# Patient Record
Sex: MALE | Employment: FULL TIME | URBAN - METROPOLITAN AREA
[De-identification: names, ages, dates, MRNs, and addresses within clinical notes are randomized per-mention and may not be internally consistent; named-entity substitution may affect disease eponyms.]

---

## 2020-07-21 ENCOUNTER — ANESTHESIA EVENT (OUTPATIENT)
Dept: OPERATING ROOM | Age: 40
End: 2020-07-21
Payer: COMMERCIAL

## 2020-07-22 ENCOUNTER — HOSPITAL ENCOUNTER (OUTPATIENT)
Age: 40
Setting detail: OUTPATIENT SURGERY
Discharge: HOME | End: 2020-07-22
Attending: PODIATRIST | Admitting: PODIATRIST
Payer: COMMERCIAL

## 2020-07-22 ENCOUNTER — ANESTHESIA (OUTPATIENT)
Dept: OPERATING ROOM | Age: 40
End: 2020-07-22
Payer: COMMERCIAL

## 2020-07-22 VITALS
HEART RATE: 52 BPM | WEIGHT: 215 LBS | RESPIRATION RATE: 16 BRPM | DIASTOLIC BLOOD PRESSURE: 84 MMHG | TEMPERATURE: 97 F | SYSTOLIC BLOOD PRESSURE: 122 MMHG | HEIGHT: 74 IN | OXYGEN SATURATION: 100 % | BODY MASS INDEX: 27.59 KG/M2

## 2020-07-22 VITALS
SYSTOLIC BLOOD PRESSURE: 87 MMHG | TEMPERATURE: 98.2 F | OXYGEN SATURATION: 100 % | RESPIRATION RATE: 5 BRPM | DIASTOLIC BLOOD PRESSURE: 54 MMHG

## 2020-07-22 PROCEDURE — 3600000013 HC SURGERY LEVEL 3 ADDTL 15MIN: Performed by: PODIATRIST

## 2020-07-22 PROCEDURE — 7100000011 HC PHASE II RECOVERY - ADDTL 15 MIN: Performed by: PODIATRIST

## 2020-07-22 PROCEDURE — 2500000003 HC RX 250 WO HCPCS: Performed by: NURSE ANESTHETIST, CERTIFIED REGISTERED

## 2020-07-22 PROCEDURE — 3700000000 HC ANESTHESIA ATTENDED CARE: Performed by: PODIATRIST

## 2020-07-22 PROCEDURE — 7100000010 HC PHASE II RECOVERY - FIRST 15 MIN: Performed by: PODIATRIST

## 2020-07-22 PROCEDURE — C1776 JOINT DEVICE (IMPLANTABLE): HCPCS | Performed by: PODIATRIST

## 2020-07-22 PROCEDURE — 2580000003 HC RX 258: Performed by: ANESTHESIOLOGY

## 2020-07-22 PROCEDURE — 3700000001 HC ADD 15 MINUTES (ANESTHESIA): Performed by: PODIATRIST

## 2020-07-22 PROCEDURE — 6360000002 HC RX W HCPCS: Performed by: ANESTHESIOLOGY

## 2020-07-22 PROCEDURE — 6360000002 HC RX W HCPCS: Performed by: NURSE ANESTHETIST, CERTIFIED REGISTERED

## 2020-07-22 PROCEDURE — 2500000003 HC RX 250 WO HCPCS: Performed by: PODIATRIST

## 2020-07-22 PROCEDURE — 2720000010 HC SURG SUPPLY STERILE: Performed by: PODIATRIST

## 2020-07-22 PROCEDURE — 2500000003 HC RX 250 WO HCPCS: Performed by: ANESTHESIOLOGY

## 2020-07-22 PROCEDURE — 7100000000 HC PACU RECOVERY - FIRST 15 MIN: Performed by: PODIATRIST

## 2020-07-22 PROCEDURE — 7100000001 HC PACU RECOVERY - ADDTL 15 MIN: Performed by: PODIATRIST

## 2020-07-22 PROCEDURE — 3600000003 HC SURGERY LEVEL 3 BASE: Performed by: PODIATRIST

## 2020-07-22 PROCEDURE — 2709999900 HC NON-CHARGEABLE SUPPLY: Performed by: PODIATRIST

## 2020-07-22 PROCEDURE — 6360000002 HC RX W HCPCS: Performed by: PODIATRIST

## 2020-07-22 DEVICE — IMPLANTABLE DEVICE: Type: IMPLANTABLE DEVICE | Status: FUNCTIONAL

## 2020-07-22 DEVICE — PATCH AMNION 2 LAYR PROTCT 4 X 4CM STERISHIELD II: Type: IMPLANTABLE DEVICE | Status: FUNCTIONAL

## 2020-07-22 RX ORDER — FENTANYL CITRATE 50 UG/ML
INJECTION, SOLUTION INTRAMUSCULAR; INTRAVENOUS PRN
Status: DISCONTINUED | OUTPATIENT
Start: 2020-07-22 | End: 2020-07-22 | Stop reason: SDUPTHER

## 2020-07-22 RX ORDER — LIDOCAINE HYDROCHLORIDE 20 MG/ML
INJECTION, SOLUTION EPIDURAL; INFILTRATION; INTRACAUDAL; PERINEURAL PRN
Status: DISCONTINUED | OUTPATIENT
Start: 2020-07-22 | End: 2020-07-22 | Stop reason: SDUPTHER

## 2020-07-22 RX ORDER — PROMETHAZINE HYDROCHLORIDE 25 MG/ML
6.25 INJECTION, SOLUTION INTRAMUSCULAR; INTRAVENOUS
Status: DISCONTINUED | OUTPATIENT
Start: 2020-07-22 | End: 2020-07-22 | Stop reason: HOSPADM

## 2020-07-22 RX ORDER — MORPHINE SULFATE 2 MG/ML
1 INJECTION, SOLUTION INTRAMUSCULAR; INTRAVENOUS EVERY 5 MIN PRN
Status: DISCONTINUED | OUTPATIENT
Start: 2020-07-22 | End: 2020-07-22 | Stop reason: HOSPADM

## 2020-07-22 RX ORDER — DIPHENHYDRAMINE HYDROCHLORIDE 50 MG/ML
12.5 INJECTION INTRAMUSCULAR; INTRAVENOUS
Status: DISCONTINUED | OUTPATIENT
Start: 2020-07-22 | End: 2020-07-22 | Stop reason: HOSPADM

## 2020-07-22 RX ORDER — ONDANSETRON 2 MG/ML
INJECTION INTRAMUSCULAR; INTRAVENOUS PRN
Status: DISCONTINUED | OUTPATIENT
Start: 2020-07-22 | End: 2020-07-22 | Stop reason: SDUPTHER

## 2020-07-22 RX ORDER — OXYCODONE HYDROCHLORIDE AND ACETAMINOPHEN 5; 325 MG/1; MG/1
1 TABLET ORAL PRN
Status: DISCONTINUED | OUTPATIENT
Start: 2020-07-22 | End: 2020-07-22 | Stop reason: HOSPADM

## 2020-07-22 RX ORDER — ONDANSETRON 2 MG/ML
4 INJECTION INTRAMUSCULAR; INTRAVENOUS PRN
Status: DISCONTINUED | OUTPATIENT
Start: 2020-07-22 | End: 2020-07-22 | Stop reason: HOSPADM

## 2020-07-22 RX ORDER — OXYCODONE HYDROCHLORIDE AND ACETAMINOPHEN 5; 325 MG/1; MG/1
2 TABLET ORAL PRN
Status: DISCONTINUED | OUTPATIENT
Start: 2020-07-22 | End: 2020-07-22 | Stop reason: HOSPADM

## 2020-07-22 RX ORDER — DEXAMETHASONE SODIUM PHOSPHATE 4 MG/ML
INJECTION, SOLUTION INTRA-ARTICULAR; INTRALESIONAL; INTRAMUSCULAR; INTRAVENOUS; SOFT TISSUE PRN
Status: DISCONTINUED | OUTPATIENT
Start: 2020-07-22 | End: 2020-07-22 | Stop reason: SDUPTHER

## 2020-07-22 RX ORDER — PROPOFOL 10 MG/ML
INJECTION, EMULSION INTRAVENOUS PRN
Status: DISCONTINUED | OUTPATIENT
Start: 2020-07-22 | End: 2020-07-22 | Stop reason: SDUPTHER

## 2020-07-22 RX ORDER — GLYCOPYRROLATE 0.2 MG/ML
INJECTION INTRAMUSCULAR; INTRAVENOUS PRN
Status: DISCONTINUED | OUTPATIENT
Start: 2020-07-22 | End: 2020-07-22 | Stop reason: SDUPTHER

## 2020-07-22 RX ORDER — LABETALOL HYDROCHLORIDE 5 MG/ML
5 INJECTION, SOLUTION INTRAVENOUS EVERY 10 MIN PRN
Status: DISCONTINUED | OUTPATIENT
Start: 2020-07-22 | End: 2020-07-22 | Stop reason: HOSPADM

## 2020-07-22 RX ORDER — HYDRALAZINE HYDROCHLORIDE 20 MG/ML
5 INJECTION INTRAMUSCULAR; INTRAVENOUS EVERY 10 MIN PRN
Status: DISCONTINUED | OUTPATIENT
Start: 2020-07-22 | End: 2020-07-22 | Stop reason: HOSPADM

## 2020-07-22 RX ORDER — SODIUM CHLORIDE, SODIUM LACTATE, POTASSIUM CHLORIDE, CALCIUM CHLORIDE 600; 310; 30; 20 MG/100ML; MG/100ML; MG/100ML; MG/100ML
INJECTION, SOLUTION INTRAVENOUS CONTINUOUS
Status: DISCONTINUED | OUTPATIENT
Start: 2020-07-22 | End: 2020-07-22 | Stop reason: HOSPADM

## 2020-07-22 RX ORDER — BUPIVACAINE HYDROCHLORIDE 5 MG/ML
INJECTION, SOLUTION EPIDURAL; INTRACAUDAL PRN
Status: DISCONTINUED | OUTPATIENT
Start: 2020-07-22 | End: 2020-07-22 | Stop reason: ALTCHOICE

## 2020-07-22 RX ORDER — LIDOCAINE HYDROCHLORIDE 10 MG/ML
INJECTION, SOLUTION EPIDURAL; INFILTRATION; INTRACAUDAL; PERINEURAL
Status: DISCONTINUED
Start: 2020-07-22 | End: 2020-07-22 | Stop reason: HOSPADM

## 2020-07-22 RX ORDER — MORPHINE SULFATE 2 MG/ML
2 INJECTION, SOLUTION INTRAMUSCULAR; INTRAVENOUS EVERY 5 MIN PRN
Status: DISCONTINUED | OUTPATIENT
Start: 2020-07-22 | End: 2020-07-22 | Stop reason: HOSPADM

## 2020-07-22 RX ORDER — LIDOCAINE HYDROCHLORIDE 10 MG/ML
2 INJECTION, SOLUTION INFILTRATION; PERINEURAL
Status: COMPLETED | OUTPATIENT
Start: 2020-07-22 | End: 2020-07-22

## 2020-07-22 RX ADMIN — LIDOCAINE HYDROCHLORIDE 100 MG: 20 INJECTION, SOLUTION EPIDURAL; INFILTRATION; INTRACAUDAL; PERINEURAL at 09:25

## 2020-07-22 RX ADMIN — HYDROMORPHONE HYDROCHLORIDE 0.5 MG: 1 INJECTION, SOLUTION INTRAMUSCULAR; INTRAVENOUS; SUBCUTANEOUS at 10:31

## 2020-07-22 RX ADMIN — Medication 2 G: at 09:26

## 2020-07-22 RX ADMIN — FENTANYL CITRATE 100 MCG: 50 INJECTION INTRAMUSCULAR; INTRAVENOUS at 09:25

## 2020-07-22 RX ADMIN — ONDANSETRON 4 MG: 2 INJECTION, SOLUTION INTRAMUSCULAR; INTRAVENOUS at 09:37

## 2020-07-22 RX ADMIN — PROPOFOL 300 MG: 10 INJECTION, EMULSION INTRAVENOUS at 09:25

## 2020-07-22 RX ADMIN — SODIUM CHLORIDE, POTASSIUM CHLORIDE, SODIUM LACTATE AND CALCIUM CHLORIDE: 600; 310; 30; 20 INJECTION, SOLUTION INTRAVENOUS at 07:40

## 2020-07-22 RX ADMIN — LIDOCAINE HYDROCHLORIDE 0.2 ML: 10 INJECTION, SOLUTION EPIDURAL; INFILTRATION; INTRACAUDAL; PERINEURAL at 07:38

## 2020-07-22 RX ADMIN — DEXAMETHASONE SODIUM PHOSPHATE 8 MG: 4 INJECTION, SOLUTION INTRAMUSCULAR; INTRAVENOUS at 09:37

## 2020-07-22 RX ADMIN — GLYCOPYRROLATE 0.2 MG: 0.2 INJECTION, SOLUTION INTRAMUSCULAR; INTRAVENOUS at 09:47

## 2020-07-22 ASSESSMENT — PAIN DESCRIPTION - PAIN TYPE
TYPE: SURGICAL PAIN
TYPE: SURGICAL PAIN

## 2020-07-22 ASSESSMENT — PULMONARY FUNCTION TESTS
PIF_VALUE: 10
PIF_VALUE: 10
PIF_VALUE: 2
PIF_VALUE: 5
PIF_VALUE: 10
PIF_VALUE: 11
PIF_VALUE: 10
PIF_VALUE: 6
PIF_VALUE: 10
PIF_VALUE: 2
PIF_VALUE: 10
PIF_VALUE: 1
PIF_VALUE: 10
PIF_VALUE: 1
PIF_VALUE: 2
PIF_VALUE: 3
PIF_VALUE: 6
PIF_VALUE: 5
PIF_VALUE: 1
PIF_VALUE: 7
PIF_VALUE: 10
PIF_VALUE: 2
PIF_VALUE: 10
PIF_VALUE: 2
PIF_VALUE: 11
PIF_VALUE: 10
PIF_VALUE: 1
PIF_VALUE: 10
PIF_VALUE: 5
PIF_VALUE: 10
PIF_VALUE: 10
PIF_VALUE: 2
PIF_VALUE: 10
PIF_VALUE: 11
PIF_VALUE: 5
PIF_VALUE: 3
PIF_VALUE: 11
PIF_VALUE: 10

## 2020-07-22 ASSESSMENT — PAIN - FUNCTIONAL ASSESSMENT: PAIN_FUNCTIONAL_ASSESSMENT: 0-10

## 2020-07-22 ASSESSMENT — PAIN SCALES - GENERAL
PAINLEVEL_OUTOF10: 0
PAINLEVEL_OUTOF10: 3
PAINLEVEL_OUTOF10: 7
PAINLEVEL_OUTOF10: 0

## 2020-07-22 ASSESSMENT — PAIN DESCRIPTION - DESCRIPTORS
DESCRIPTORS: ACHING
DESCRIPTORS: ACHING

## 2020-07-22 ASSESSMENT — PAIN DESCRIPTION - LOCATION
LOCATION: FOOT
LOCATION: FOOT

## 2020-07-22 ASSESSMENT — PAIN DESCRIPTION - ORIENTATION
ORIENTATION: RIGHT
ORIENTATION: RIGHT

## 2020-07-22 ASSESSMENT — PAIN DESCRIPTION - FREQUENCY
FREQUENCY: CONTINUOUS
FREQUENCY: CONTINUOUS

## 2020-07-22 NOTE — ANESTHESIA PRE PROCEDURE
Department of Anesthesiology  Preprocedure Note       Name:  Hudson Rubinstein   Age:  37 y.o.  :  1980                                          MRN:  [id number]         Date:  2020      Surgeon: Mirtha Virk):  Lori Mojica DPM    Procedure: Procedure(s):  RIGHT FOOT CHEILECTOMY, FUSION SECOND DIGIT DISTAL INTERPHALANGEAL JOINT    Medications prior to admission:   Prior to Admission medications    Not on File       Current medications:    No current facility-administered medications for this encounter. Allergies: Allergies   Allergen Reactions    Pcn [Penicillins]      As a child       Problem List:  There is no problem list on file for this patient. Past Medical History:  No past medical history on file. Past Surgical History:        Procedure Laterality Date    BACK SURGERY      HERNIA REPAIR         Social History:    Social History     Tobacco Use    Smoking status: Current Every Day Smoker     Packs/day: 1.00    Smokeless tobacco: Never Used   Substance Use Topics    Alcohol use: Yes     Comment: social                                Ready to quit: Not Answered  Counseling given: Not Answered      Vital Signs (Current):   Vitals:    07/15/20 1533   Weight: 215 lb (97.5 kg)   Height: 6' 2\" (1.88 m)                                              BP Readings from Last 3 Encounters:   No data found for BP       NPO Status:                                                                                 BMI:   Wt Readings from Last 3 Encounters:   07/15/20 215 lb (97.5 kg)     Body mass index is 27.6 kg/m². CBC: No results found for: WBC, RBC, HGB, HCT, MCV, RDW, PLT    CMP: No results found for: NA, K, CL, CO2, BUN, CREATININE, GFRAA, AGRATIO, LABGLOM, GLUCOSE, PROT, CALCIUM, BILITOT, ALKPHOS, AST, ALT    POC Tests: No results for input(s): POCGLU, POCNA, POCK, POCCL, POCBUN, POCHEMO, POCHCT in the last 72 hours.     Coags: No results found for: PROTIME, INR, APTT    HCG (If Applicable): No results found for: PREGTESTUR, PREGSERUM, HCG, HCGQUANT     ABGs: No results found for: PHART, PO2ART, ZJQ7DLF, VUF8DTI, BEART, O2CIDIHN     Type & Screen (If Applicable):  No results found for: LABABO, LABRH    Drug/Infectious Status (If Applicable):  No results found for: HIV, HEPCAB    COVID-19 Screening (If Applicable): No results found for: COVID19      Anesthesia Evaluation  Patient summary reviewed and Nursing notes reviewed no history of anesthetic complications:   Airway: Mallampati: II     Neck ROM: full   Dental:          Pulmonary:Negative Pulmonary ROS and normal exam                               Cardiovascular:Negative CV ROS                      Neuro/Psych:   Negative Neuro/Psych ROS              GI/Hepatic/Renal: Neg GI/Hepatic/Renal ROS       (-) hiatal hernia and GERD       Endo/Other: Negative Endo/Other ROS                    Abdominal:           Vascular:                                        Anesthesia Plan      general     ASA 2     (I discussed with the patient the risks and benefits of PIV, general anesthesia, IV Narcotics, PACU. All questions were answered the patient agrees with the plan and wishes to proceed.  )  Induction: intravenous.                           Carmen Adams MD   7/22/2020

## 2020-07-22 NOTE — H&P
H&P reviewed, the patient was examined, and no changes have occurred in the patient's condition since the H&P was completed. Site confirmed and postoperative instructions reviewed with patient.

## 2020-07-22 NOTE — ANESTHESIA POSTPROCEDURE EVALUATION
Department of Anesthesiology  Postprocedure Note    Patient: Aline Christensen  MRN: [id number]  YOB: 1980  Date of evaluation: 7/22/2020  Time:  11:30 AM     Procedure Summary     Date:  07/22/20 Room / Location:  [address] [place] [address] OR 01 / [facility]    Anesthesia Start:  7825 Anesthesia Stop:  2050    Procedure:  RIGHT FOOT CHEILECTOMY, FUSION SECOND DIGIT DISTAL INTERPHALANGEAL JOINT (Right Foot) Diagnosis:  (RIGHT FOOT HALLUX LIMITUS, SECOND DIGIT HAMMER TOE)    Surgeon:  Jesus Williamson DPM Responsible Provider:  Laura Elizalde MD    Anesthesia Type:  general ASA Status:  2          Anesthesia Type: general    Nate Phase I: Nate Score: 10    Nate Phase II: Nate Score: 10    Last vitals: Reviewed and per EMR flowsheets.        Anesthesia Post Evaluation    Comments: Postoperative Anesthesia Note    Name:    Aline Christensen  MRN:      [id number]    Patient Vitals in the past 12 hrs:  07/22/20 1057, BP:122/84, Pulse:52, Resp:16, SpO2:100 %  07/22/20 1040, BP:121/89, Temp:97 °F (36.1 °C), Temp src:Temporal, Pulse:58, Resp:16, SpO2:100 %  07/22/20 1031, BP:128/89, Pulse:57, Resp:16, SpO2:100 %  07/22/20 1025, BP:(!) 131/94, Pulse:57, Resp:15, SpO2:99 %  07/22/20 1020, BP:124/89, Pulse:59, Resp:15, SpO2:99 %  07/22/20 1015, BP:123/86, Pulse:65, Resp:12  07/22/20 1010, BP:123/89, Temp:97 °F (36.1 °C), Temp src:Temporal, Pulse:67, Resp:11, SpO2:99 %  07/22/20 0725, BP:115/73, Temp:98 °F (36.7 °C), Temp src:Temporal, Pulse:73, Resp:16, SpO2:99 %     LABS:    CBC  No results found for: WBC, HGB, HCT, PLT  RENAL  No results found for: NA, K, CL, CO2, BUN, CREATININE, GLUCOSE  COAGS  No results found for: PROTIME, INR, APTT    Intake & Output:  @08JUZK@    Nausea & Vomiting:  No    Level of Consciousness:  Awake    Pain Assessment:  Adequate analgesia    Anesthesia Complications:  No apparent anesthetic complications    SUMMARY      Vital signs stable  OK to discharge from Stage I post anesthesia care.  Care transferred from Anesthesiology department on discharge from perioperative area

## 2020-07-22 NOTE — OP NOTE
Operative Note      Patient: Jacob Castaneda  YOB: 1980  MRN: [id number]    Date of Procedure: 7/22/2020    Pre-Op Diagnosis: RIGHT FOOT HALLUX LIMITUS, SECOND DIGIT HAMMER TOE    Post-Op Diagnosis: Same       Procedure(s):  RIGHT FOOT CHEILECTOMY, FUSION SECOND DIGIT DISTAL INTERPHALANGEAL JOINT AND ARTHROPLASTY MPJ    Surgeon(s):  Bryson Lee DPM    Assistant:   Surgical Assistant: Mary Herman    Anesthesia: General    Estimated Blood Loss (mL): Minimal    Complications: None    Specimens:   * No specimens in log *    Implants:  CrossTie implant, Sterishield graft      Drains: * No LDAs found *    Detailed Description of Procedure: The patient was brought to the operating room and placed on the  operating table in the supine position. After mild sedation, the right foot  was anesthetized. The right foot was then scrubbed and draped in the usual  sterile aseptic manner. An Esmarch bandage was used to exsanguinate the right  foot and ankle and a well-padded ankle tourniquet was inflated to 250 mmHg. Procedure #1:  Attention was now directed at the dorsal aspect of the first metatarsal  head, in the first MPJ area. Approximately a 4-cm incision was placed in  this area. Careful sharp and dull dissection was carried down deep to the  skin and subcutaneous tissue. All bleeding vessels were ligated. Next, an  incision was placed through the periosteum and the MPJ capsule, making sure  to avoid the extensor tendon and the important neurovascular structures in  the area. These structures were then reflected, exposing the first  metatarsal head, which was noted to have a very large dorsal exostosis and  severe arthritic changes at the articular surface with no cartilage noted. Using the sagittal saw, the dorsal exostosis, as well as medial and lateral  exostosis were removed.   Approximately 50% of the dorsal surface of the   metatarsal articular surface was noted to be without any cartilage. After excision of the dorsal exostosis, the remainder of the arthritic   articular surface was drilled with 0.045 k-wire to promote bleeding and   fibrocartilaginous growth. This wound was then thoroughly irrigated   and later closed in layer fashion. Resection of the exostosis, right hallux. Attention was now directed at  the dorsal aspect of the right toe. Approximately one cm incision is placed  in this area. Careful sharp and dull dissection was carried down to the skin  and subcutaneous tissue. All bleeding vessels were ligated. Next, an  incision was placed through the periosteum of the proximal phalanx base,  exposing the proximal phalanx base, which was also noticed to have exostosis  dorsally, medially and laterally which was removed. The patient was also  noted to have some cartilage damage at the superior lateral margin. The joint range of  motion was evaluated and noted to be significantly improved compared to preoperative  range of motion. This wound was then thoroughly irrigated, Steri-shield graft was placed in the joint and 2-0 Vicryl  sutures were used to reapproximate the periosteum and the MPJ capsule,  followed by application of 3-0 Vicryl sutures to reapproximate the  subcutaneous tissue, 4-0 Vicryl sutures were used to reapproximate the skin  edges in a continuous subcuticular manner. Procedure #2 :  Arthrodesis of the distal interphalangeal joint,  second toe. Attention was then directed to the second toe DIPJ where 3 cm incision was placed and carried down to the joint lever. a transversetenotomy was performed at the level of the proximal interphalangeal joint. Utilizing a #15 blade, the medial and lateral collateral ligaments were  excised and the extensor tendon was reflected proximally, thereby exposing  the distal interphalangeal joint of the second toe.   The distal aspect of  the extensor tendon was also retracted distally, thereby exposing the base of  the middle phalanx. Utilizing a sagittal saw, the joint was resected. The  bone ends were then excised and passed from the operative field. Attention was then directed to the arthrodesis site of the distal  interphalangeal joint, second toe, where utilizing a 2.0 mm drill bit, the  bone ends were prepared for arthrodesis, after which the broaches were  utilized for the middle phalanx and the proximal phalanx in preparation for  insertion of a Crosstie Toe implant, the implant was placed in  the arthrodesis site. Following placement of the implant, the distal phalanx  was compressed upon the proximal phalanx and adequate bony apposition was  noted. Significant flexion contracture was noted at the PIPJ and dorsal contracture at the MPJ. Procedure #3:  Repair of second metatarsophalangeal joint, right  foot. Attention was then directed to the dorsal aspect of the right foot,  where upon palpation, the second metatarsophalangeal joint was encountered. Utilizing a Skin Scribe, a linear incision was made extending from the head  of the second metatarsal distally to the level of the midshaft of the middle  phalanx of the right second toe. An incision was then made utilizing a 15  blade down through skin. The incision was then carried down through  superficial and deep structures utilizing a combination of blunt and sharp  dissection, with care being taken to retract all vital neurovascular tissue. Following dissection, the extensor tendon of the second toe was identified,  and utilizing a #15 blade, the extensor kim apparatus was released. The  extensor tendon was then retracted laterally, thereby exposing the second  metatarsophalangeal joint capsule. At this point, utilizing a 15 blade, a  transverse capsulotomy was made at the metatarsophalangeal of the second toe. This capsulotomy was performed to both the medial, lateral and dorsal  aspects of the capsule.   At this point, utilizing a McGlamry elevator, the  plantar plate apparatus was released and the contracture noted to be second  metatarsophalangeal joint was corrected. Separate incision was made at the plantar PIPJ and the flexor tendon was release. 3-0 Vicryl sutures to reapproximate the  subcutaneous tissue, 4-0 Vicryl sutures were used to reapproximate the skin  edges in a continuous subcuticular manner. At this point, the pneumatic ankle tourniquet was deflated. Prompt hyperemic  response was noted to all aspects of the right foot. Marcaine 0.5% plain, 20  mL, were injected proximal to the surgical sites for postoperative  anesthetic. The foot was then dressed with a sterile Kerlix, followed by the  application of a 4 inch Ace. The patient tolerated the procedure and anesthesia well. The patient left  the operating room with vital signs stable and vascular status intact to the  right foot.              Electronically signed by Jesus Williamson DPM on 7/22/2020 at 10:16 AM

## 2023-05-13 ENCOUNTER — APPOINTMENT (OUTPATIENT)
Dept: GENERAL RADIOLOGY | Age: 43
End: 2023-05-13
Payer: COMMERCIAL

## 2023-05-13 ENCOUNTER — HOSPITAL ENCOUNTER (EMERGENCY)
Age: 43
Discharge: HOME | End: 2023-05-13
Attending: STUDENT IN AN ORGANIZED HEALTH CARE EDUCATION/TRAINING PROGRAM
Payer: COMMERCIAL

## 2023-05-13 VITALS
TEMPERATURE: 97.9 F | SYSTOLIC BLOOD PRESSURE: 130 MMHG | HEIGHT: 74 IN | WEIGHT: 216.8 LBS | RESPIRATION RATE: 18 BRPM | BODY MASS INDEX: 27.82 KG/M2 | HEART RATE: 69 BPM | OXYGEN SATURATION: 96 % | DIASTOLIC BLOOD PRESSURE: 74 MMHG

## 2023-05-13 DIAGNOSIS — M79.644 FINGER PAIN, RIGHT: Primary | ICD-10-CM

## 2023-05-13 PROCEDURE — 99284 EMERGENCY DEPT VISIT MOD MDM: CPT

## 2023-05-13 PROCEDURE — 90715 TDAP VACCINE 7 YRS/> IM: CPT | Performed by: PHYSICIAN ASSISTANT

## 2023-05-13 PROCEDURE — 64400 NJX AA&/STRD TRIGEMINAL NRV: CPT

## 2023-05-13 PROCEDURE — 96372 THER/PROPH/DIAG INJ SC/IM: CPT

## 2023-05-13 PROCEDURE — 73140 X-RAY EXAM OF FINGER(S): CPT

## 2023-05-13 PROCEDURE — 6370000000 HC RX 637 (ALT 250 FOR IP): Performed by: STUDENT IN AN ORGANIZED HEALTH CARE EDUCATION/TRAINING PROGRAM

## 2023-05-13 PROCEDURE — 2500000003 HC RX 250 WO HCPCS: Performed by: PHYSICIAN ASSISTANT

## 2023-05-13 PROCEDURE — 90471 IMMUNIZATION ADMIN: CPT | Performed by: PHYSICIAN ASSISTANT

## 2023-05-13 PROCEDURE — 6360000002 HC RX W HCPCS: Performed by: PHYSICIAN ASSISTANT

## 2023-05-13 RX ORDER — OXYCODONE HYDROCHLORIDE 5 MG/1
5 TABLET ORAL ONCE
Status: COMPLETED | OUTPATIENT
Start: 2023-05-13 | End: 2023-05-13

## 2023-05-13 RX ORDER — DOXYCYCLINE HYCLATE 100 MG/1
100 CAPSULE ORAL 2 TIMES DAILY
Qty: 20 CAPSULE | Refills: 0 | COMMUNITY
Start: 2023-05-12 | End: 2023-05-22

## 2023-05-13 RX ORDER — ACETAMINOPHEN 500 MG
1000 TABLET ORAL ONCE
Status: COMPLETED | OUTPATIENT
Start: 2023-05-13 | End: 2023-05-13

## 2023-05-13 RX ORDER — SULFAMETHOXAZOLE AND TRIMETHOPRIM 800; 160 MG/1; MG/1
TABLET ORAL
COMMUNITY
Start: 2023-05-10

## 2023-05-13 RX ADMIN — TETANUS TOXOID, REDUCED DIPHTHERIA TOXOID AND ACELLULAR PERTUSSIS VACCINE, ADSORBED 0.5 ML: 5; 2.5; 8; 8; 2.5 SUSPENSION INTRAMUSCULAR at 17:42

## 2023-05-13 RX ADMIN — OXYCODONE 5 MG: 5 TABLET ORAL at 17:41

## 2023-05-13 RX ADMIN — ACETAMINOPHEN 1000 MG: 500 TABLET ORAL at 17:41

## 2023-05-13 RX ADMIN — LIDOCAINE HYDROCHLORIDE 5 ML: 10 INJECTION, SOLUTION EPIDURAL; INFILTRATION; INTRACAUDAL; PERINEURAL at 15:43

## 2023-05-13 ASSESSMENT — PAIN DESCRIPTION - LOCATION
LOCATION: FINGER (COMMENT WHICH ONE)

## 2023-05-13 ASSESSMENT — PAIN DESCRIPTION - DESCRIPTORS
DESCRIPTORS: ACHING;BURNING;THROBBING
DESCRIPTORS: THROBBING

## 2023-05-13 ASSESSMENT — ENCOUNTER SYMPTOMS
VOMITING: 0
ABDOMINAL PAIN: 0
DIARRHEA: 0
NAUSEA: 1

## 2023-05-13 ASSESSMENT — PAIN SCALES - GENERAL
PAINLEVEL_OUTOF10: 3
PAINLEVEL_OUTOF10: 7
PAINLEVEL_OUTOF10: 3

## 2023-05-13 ASSESSMENT — PAIN DESCRIPTION - ORIENTATION: ORIENTATION: RIGHT

## 2023-05-13 ASSESSMENT — PAIN DESCRIPTION - FREQUENCY: FREQUENCY: CONTINUOUS

## 2023-05-13 ASSESSMENT — PAIN DESCRIPTION - PAIN TYPE: TYPE: ACUTE PAIN

## 2023-05-13 ASSESSMENT — PAIN - FUNCTIONAL ASSESSMENT: PAIN_FUNCTIONAL_ASSESSMENT: 0-10

## 2023-05-13 NOTE — ED NOTES
15:49 right middle finger cleaned with soap and water.   DSD and kerlex dressing applied per order of Tomeka Medeiros, KELLY  05/13/23 [id number]

## 2023-05-13 NOTE — ED PROVIDER NOTES
referral to hand surgery for follow-up. Patient and wife expressed understanding and were amenable to plan. All questions were answered prior to discharge.      Luz Marina Lopez MD  05/14/23 3779
05/13/23. ED BEDSIDE ULTRASOUND:  No results found. RECENT VITALS:  BP: 124/86, Temp: 97.9 °F (36.6 °C), Pulse: 75, Respirations: 16, SpO2: 98 %     Procedures     Digital block was performed on the third digit of the patient's right hand using 1% lidocaine without epinephrine using aseptic technique. Patient tolerated procedure well and anesthetic was effective. The area of induration at the distal tip of the third digit of the right finger was subsequently pierced and drained using a 21-gauge needle producing purulent discharge and a small amount of blood. The area of induration was significantly reduced following this procedure. Patient tolerated procedure well. ED Course     Nursing Notes, Past Medical Hx,Past Surgical Hx, Social Hx, Allergies, and Family Hx were reviewed. The patient was given the following medications:  Orders Placed This Encounter   Medications    lidocaine 1 % injection 5 mL       CONSULTS:  None    MEDICAL DECISION MAKING / ASSESSMENT / PLAN     Samira Alfaro is a 38 y.o. male who was diagnosed with a paronychia of the third digit of his right hand by his primary care provider 3 days ago and was placed on Bactrim after noticing pain, redness and swelling over the distal digit 2 days prior. Patient's symptoms did not improve and his primary care provider placed him on doxycycline yesterday. Patient reports that his pain, swelling, redness have not improved today and he comes to the emergency department for further evaluation. He denies prior similar symptoms. He denies any known injury to the finger. Patient notes that he did stick a needle in the swollen area at the distal tip of his finger last night producing a small amount of pus and blood. He does note some nausea but otherwise denies systemic symptoms including fevers, vomiting, diarrhea, chills. He is alert and oriented x4. Vital signs are stable.   On exam the patient has swelling present to the third

## 2023-05-13 NOTE — DISCHARGE INSTRUCTIONS
As we discussed, if you are symptoms have worsened at this time tomorrow, systemic symptoms have developed including vomiting, fevers, or you have other concerns, return to the emergency department. Continue to take your prescribed antibiotics. Keep the area clean and dressed. Follow-up with your primary care provider and hand surgery next week for reevaluation.

## (undated) DEVICE — SMALL OSC. SAW BLADE, 9MM X 24.6MM X 0.38MM: Brand: MICROAIRE®

## (undated) DEVICE — BANDAGE COMPR ELASTIC 5 YDX3 IN COBAN

## (undated) DEVICE — SUTURE VCRL SZ 2-0 L27IN ABSRB UD L26MM CT-2 1/2 CIR J269H

## (undated) DEVICE — INTRAOSSEOUS FIXATION SYSTEM TO AID IN FIXATION OF FRACTURES, FUSIONS, AND OSTEOTOMIES OF THE TOES, SUCH AS HAMMERTOE, CLAW TOE, MALLET TOE AND INTER-DIGITAL FUSIONS.: Brand: CROSSTIE™   REAMER 2.7MM

## (undated) DEVICE — SUTURE COAT VCRL SZ 4-0 L18IN ABSRB UD L19MM PS-2 1/2 CIR J496G

## (undated) DEVICE — PADDING UNDERCAST W6INXL4YD WYTEX 6 PER BG

## (undated) DEVICE — SUTURE VCRL SZ 3-0 L27IN ABSRB UD L26MM CT-2 1/2 CIR J232H

## (undated) DEVICE — SYRINGE MED 10ML LUERLOCK TIP W/O SFTY DISP

## (undated) DEVICE — COTTON UNDERCAST PADDING,CRIMPED FINISH: Brand: WEBRIL

## (undated) DEVICE — Z CONVERTED USE 2273164 BANDAGE COMPR W4INXL4 1/2YD E EC SGL LAYERED CLP CLSR ECONO

## (undated) DEVICE — BANDAGE COMPR W6INXL4.5YD LTWT E EC SGL LAYERED CLP CLSR

## (undated) DEVICE — KIT BNE SCR 3MM HDLSS W/ RMR

## (undated) DEVICE — GLOVE SURG SZ 65 L12IN FNGR THK94MIL STD WHT ISOLEX LTX

## (undated) DEVICE — NEEDLE HYPO 18GA L1.5IN PNK POLYPR HUB S STL THN WALL FILL

## (undated) DEVICE — NEEDLE HYPO 22GA L1.5IN BLK POLYPR HUB S STL REG BVL STR

## (undated) DEVICE — SOLUTION IV IRRIG 500ML 0.9% SODIUM CHL 2F7123

## (undated) DEVICE — SPLINT ORTH W4XL30IN LAYERED FBRGLS FOAM PD BRTH BK MOLD

## (undated) DEVICE — PADDING CAST W4INXL4YD NONSTERILE COT RAYON MICROPLEATED

## (undated) DEVICE — GLOVE SURG SZ 7 L12IN FNGR THK94MIL STD WHT ISOLEX LTX FREE